# Patient Record
Sex: MALE | Race: BLACK OR AFRICAN AMERICAN
[De-identification: names, ages, dates, MRNs, and addresses within clinical notes are randomized per-mention and may not be internally consistent; named-entity substitution may affect disease eponyms.]

---

## 2019-03-20 ENCOUNTER — HOSPITAL ENCOUNTER (INPATIENT)
Dept: HOSPITAL 74 - YASAS | Age: 54
LOS: 4 days | Discharge: HOME | DRG: 773 | End: 2019-03-24
Attending: SURGERY | Admitting: SURGERY
Payer: COMMERCIAL

## 2019-03-20 VITALS — BODY MASS INDEX: 32.8 KG/M2

## 2019-03-20 DIAGNOSIS — Z79.84: ICD-10-CM

## 2019-03-20 DIAGNOSIS — I10: ICD-10-CM

## 2019-03-20 DIAGNOSIS — F17.210: ICD-10-CM

## 2019-03-20 DIAGNOSIS — F11.23: Primary | ICD-10-CM

## 2019-03-20 DIAGNOSIS — F10.230: ICD-10-CM

## 2019-03-20 DIAGNOSIS — Z88.2: ICD-10-CM

## 2019-03-20 DIAGNOSIS — E66.9: ICD-10-CM

## 2019-03-20 DIAGNOSIS — E11.65: ICD-10-CM

## 2019-03-20 DIAGNOSIS — M54.5: ICD-10-CM

## 2019-03-20 DIAGNOSIS — F32.9: ICD-10-CM

## 2019-03-20 DIAGNOSIS — F14.20: ICD-10-CM

## 2019-03-20 DIAGNOSIS — R76.11: ICD-10-CM

## 2019-03-20 DIAGNOSIS — Z21: ICD-10-CM

## 2019-03-20 PROCEDURE — HZ2ZZZZ DETOXIFICATION SERVICES FOR SUBSTANCE ABUSE TREATMENT: ICD-10-PCS | Performed by: SURGERY

## 2019-03-20 RX ADMIN — Medication SCH MG: at 22:46

## 2019-03-21 LAB
ALBUMIN SERPL-MCNC: 2.6 G/DL (ref 3.4–5)
ALP SERPL-CCNC: 78 U/L (ref 45–117)
ALT SERPL-CCNC: 15 U/L (ref 13–61)
ANION GAP SERPL CALC-SCNC: 5 MMOL/L (ref 8–16)
AST SERPL-CCNC: 8 U/L (ref 15–37)
BILIRUB SERPL-MCNC: 0.3 MG/DL (ref 0.2–1)
BUN SERPL-MCNC: 14 MG/DL (ref 7–18)
CALCIUM SERPL-MCNC: 8.4 MG/DL (ref 8.5–10.1)
CHLORIDE SERPL-SCNC: 112 MMOL/L (ref 98–107)
CO2 SERPL-SCNC: 28 MMOL/L (ref 21–32)
CREAT SERPL-MCNC: 1.3 MG/DL (ref 0.55–1.3)
DEPRECATED RDW RBC AUTO: 15.3 % (ref 11.9–15.9)
GLUCOSE SERPL-MCNC: 133 MG/DL (ref 74–106)
HCT VFR BLD CALC: 38.6 % (ref 35.4–49)
HGB BLD-MCNC: 12.6 GM/DL (ref 11.7–16.9)
MCH RBC QN AUTO: 29.1 PG (ref 25.7–33.7)
MCHC RBC AUTO-ENTMCNC: 32.6 G/DL (ref 32–35.9)
MCV RBC: 89.5 FL (ref 80–96)
PLATELET # BLD AUTO: 198 K/MM3 (ref 134–434)
PMV BLD: 7.9 FL (ref 7.5–11.1)
POTASSIUM SERPLBLD-SCNC: 3.9 MMOL/L (ref 3.5–5.1)
PROT SERPL-MCNC: 6 G/DL (ref 6.4–8.2)
RBC # BLD AUTO: 4.31 M/MM3 (ref 4–5.6)
SODIUM SERPL-SCNC: 145 MMOL/L (ref 136–145)
WBC # BLD AUTO: 5.7 K/MM3 (ref 4–10)

## 2019-03-21 RX ADMIN — AMLODIPINE BESYLATE SCH MG: 10 TABLET ORAL at 10:56

## 2019-03-21 RX ADMIN — Medication SCH TAB: at 10:56

## 2019-03-21 RX ADMIN — NICOTINE SCH: 21 PATCH TRANSDERMAL at 10:56

## 2019-03-21 RX ADMIN — ASPIRIN SCH MG: 81 TABLET, COATED ORAL at 10:56

## 2019-03-21 RX ADMIN — LIDOCAINE SCH PATCH: 50 PATCH TOPICAL at 14:53

## 2019-03-21 RX ADMIN — Medication SCH: at 22:42

## 2019-03-22 RX ADMIN — Medication PRN MG: at 22:02

## 2019-03-22 RX ADMIN — NICOTINE SCH: 21 PATCH TRANSDERMAL at 10:03

## 2019-03-22 RX ADMIN — AMLODIPINE BESYLATE SCH MG: 10 TABLET ORAL at 10:03

## 2019-03-22 RX ADMIN — Medication SCH TAB: at 10:03

## 2019-03-22 RX ADMIN — Medication SCH MG: at 22:02

## 2019-03-22 RX ADMIN — Medication SCH: at 23:10

## 2019-03-22 RX ADMIN — ASPIRIN SCH MG: 81 TABLET, COATED ORAL at 10:03

## 2019-03-22 RX ADMIN — LIDOCAINE SCH PATCH: 50 PATCH TOPICAL at 10:06

## 2019-03-23 VITALS — TEMPERATURE: 98.2 F

## 2019-03-23 RX ADMIN — ASPIRIN SCH MG: 81 TABLET, COATED ORAL at 10:03

## 2019-03-23 RX ADMIN — LIDOCAINE SCH PATCH: 50 PATCH TOPICAL at 10:05

## 2019-03-23 RX ADMIN — Medication SCH EACH: at 22:11

## 2019-03-23 RX ADMIN — Medication SCH MG: at 22:11

## 2019-03-23 RX ADMIN — Medication PRN MG: at 22:11

## 2019-03-23 RX ADMIN — Medication SCH TAB: at 10:03

## 2019-03-23 RX ADMIN — AMLODIPINE BESYLATE SCH MG: 10 TABLET ORAL at 10:03

## 2019-03-23 RX ADMIN — NICOTINE SCH: 21 PATCH TRANSDERMAL at 10:04

## 2019-03-24 VITALS — HEART RATE: 80 BPM | DIASTOLIC BLOOD PRESSURE: 78 MMHG | SYSTOLIC BLOOD PRESSURE: 145 MMHG

## 2019-05-18 ENCOUNTER — HOSPITAL ENCOUNTER (INPATIENT)
Dept: HOSPITAL 74 - YASAS | Age: 54
LOS: 4 days | Discharge: HOME | DRG: 773 | End: 2019-05-22
Attending: SURGERY | Admitting: SURGERY
Payer: COMMERCIAL

## 2019-05-18 VITALS — BODY MASS INDEX: 31.7 KG/M2

## 2019-05-18 DIAGNOSIS — F32.9: ICD-10-CM

## 2019-05-18 DIAGNOSIS — E11.9: ICD-10-CM

## 2019-05-18 DIAGNOSIS — F17.213: ICD-10-CM

## 2019-05-18 DIAGNOSIS — R76.11: ICD-10-CM

## 2019-05-18 DIAGNOSIS — F41.8: ICD-10-CM

## 2019-05-18 DIAGNOSIS — G62.9: ICD-10-CM

## 2019-05-18 DIAGNOSIS — Z79.84: ICD-10-CM

## 2019-05-18 DIAGNOSIS — Z21: ICD-10-CM

## 2019-05-18 DIAGNOSIS — F10.230: Primary | ICD-10-CM

## 2019-05-18 DIAGNOSIS — I10: ICD-10-CM

## 2019-05-18 DIAGNOSIS — F11.23: ICD-10-CM

## 2019-05-18 DIAGNOSIS — F33.1: ICD-10-CM

## 2019-05-18 PROCEDURE — HZ2ZZZZ DETOXIFICATION SERVICES FOR SUBSTANCE ABUSE TREATMENT: ICD-10-PCS | Performed by: SURGERY

## 2019-05-18 RX ADMIN — GABAPENTIN SCH MG: 400 CAPSULE ORAL at 23:32

## 2019-05-18 RX ADMIN — AMLODIPINE BESYLATE SCH MG: 10 TABLET ORAL at 23:33

## 2019-05-18 RX ADMIN — Medication SCH MG: at 23:32

## 2019-05-19 LAB
ALBUMIN SERPL-MCNC: 2.8 G/DL (ref 3.4–5)
ALP SERPL-CCNC: 71 U/L (ref 45–117)
ALT SERPL-CCNC: 24 U/L (ref 13–61)
ANION GAP SERPL CALC-SCNC: 8 MMOL/L (ref 8–16)
AST SERPL-CCNC: 11 U/L (ref 15–37)
BILIRUB SERPL-MCNC: 0.2 MG/DL (ref 0.2–1)
BUN SERPL-MCNC: 10 MG/DL (ref 7–18)
CALCIUM SERPL-MCNC: 8.9 MG/DL (ref 8.5–10.1)
CHLORIDE SERPL-SCNC: 106 MMOL/L (ref 98–107)
CO2 SERPL-SCNC: 29 MMOL/L (ref 21–32)
CREAT SERPL-MCNC: 1.2 MG/DL (ref 0.55–1.3)
DEPRECATED RDW RBC AUTO: 15.7 % (ref 11.9–15.9)
GLUCOSE SERPL-MCNC: 117 MG/DL (ref 74–106)
HCT VFR BLD CALC: 42.2 % (ref 35.4–49)
HGB BLD-MCNC: 13.3 GM/DL (ref 11.7–16.9)
MCH RBC QN AUTO: 28.6 PG (ref 25.7–33.7)
MCHC RBC AUTO-ENTMCNC: 31.6 G/DL (ref 32–35.9)
MCV RBC: 90.6 FL (ref 80–96)
PLATELET # BLD AUTO: 201 K/MM3 (ref 134–434)
PMV BLD: 8.1 FL (ref 7.5–11.1)
POTASSIUM SERPLBLD-SCNC: 3.5 MMOL/L (ref 3.5–5.1)
PROT SERPL-MCNC: 6.3 G/DL (ref 6.4–8.2)
RBC # BLD AUTO: 4.65 M/MM3 (ref 4–5.6)
SODIUM SERPL-SCNC: 142 MMOL/L (ref 136–145)
WBC # BLD AUTO: 8.1 K/MM3 (ref 4–10)

## 2019-05-19 RX ADMIN — Medication SCH TAB: at 10:31

## 2019-05-19 RX ADMIN — METFORMIN HYDROCHLORIDE SCH MG: 500 TABLET ORAL at 17:38

## 2019-05-19 RX ADMIN — GABAPENTIN SCH MG: 400 CAPSULE ORAL at 10:31

## 2019-05-19 RX ADMIN — NICOTINE SCH: 21 PATCH TRANSDERMAL at 10:31

## 2019-05-19 RX ADMIN — METFORMIN HYDROCHLORIDE SCH MG: 500 TABLET ORAL at 06:19

## 2019-05-19 RX ADMIN — GABAPENTIN SCH MG: 400 CAPSULE ORAL at 22:19

## 2019-05-19 RX ADMIN — AMLODIPINE BESYLATE SCH MG: 10 TABLET ORAL at 10:31

## 2019-05-19 RX ADMIN — Medication SCH MG: at 22:19

## 2019-05-20 RX ADMIN — Medication SCH TAB: at 10:35

## 2019-05-20 RX ADMIN — GABAPENTIN SCH MG: 400 CAPSULE ORAL at 10:35

## 2019-05-20 RX ADMIN — AMLODIPINE BESYLATE SCH MG: 10 TABLET ORAL at 10:35

## 2019-05-20 RX ADMIN — METFORMIN HYDROCHLORIDE SCH MG: 500 TABLET ORAL at 17:24

## 2019-05-20 RX ADMIN — Medication SCH MG: at 22:20

## 2019-05-20 RX ADMIN — NICOTINE SCH: 21 PATCH TRANSDERMAL at 10:35

## 2019-05-20 RX ADMIN — GABAPENTIN SCH MG: 400 CAPSULE ORAL at 22:20

## 2019-05-20 RX ADMIN — METFORMIN HYDROCHLORIDE SCH MG: 500 TABLET ORAL at 06:10

## 2019-05-21 RX ADMIN — AMLODIPINE BESYLATE SCH MG: 10 TABLET ORAL at 10:12

## 2019-05-21 RX ADMIN — GABAPENTIN SCH: 400 CAPSULE ORAL at 22:26

## 2019-05-21 RX ADMIN — Medication SCH: at 22:26

## 2019-05-21 RX ADMIN — METFORMIN HYDROCHLORIDE SCH MG: 500 TABLET ORAL at 06:20

## 2019-05-21 RX ADMIN — Medication SCH TAB: at 10:12

## 2019-05-21 RX ADMIN — NICOTINE SCH: 21 PATCH TRANSDERMAL at 10:11

## 2019-05-21 RX ADMIN — METFORMIN HYDROCHLORIDE SCH MG: 500 TABLET ORAL at 17:10

## 2019-05-21 RX ADMIN — GABAPENTIN SCH MG: 400 CAPSULE ORAL at 10:12

## 2019-05-22 VITALS — TEMPERATURE: 97.6 F | SYSTOLIC BLOOD PRESSURE: 146 MMHG | HEART RATE: 79 BPM | DIASTOLIC BLOOD PRESSURE: 91 MMHG

## 2019-05-22 RX ADMIN — METFORMIN HYDROCHLORIDE SCH MG: 500 TABLET ORAL at 06:43

## 2019-12-28 ENCOUNTER — INPATIENT (INPATIENT)
Facility: HOSPITAL | Age: 54
LOS: 1 days | Discharge: HOME | End: 2019-12-30
Attending: INTERNAL MEDICINE | Admitting: INTERNAL MEDICINE
Payer: MEDICAID

## 2019-12-28 VITALS
DIASTOLIC BLOOD PRESSURE: 53 MMHG | WEIGHT: 225.09 LBS | RESPIRATION RATE: 24 BRPM | HEART RATE: 137 BPM | OXYGEN SATURATION: 87 % | SYSTOLIC BLOOD PRESSURE: 119 MMHG | HEIGHT: 69 IN | TEMPERATURE: 99 F

## 2019-12-28 LAB
ALBUMIN SERPL ELPH-MCNC: 3.6 G/DL — SIGNIFICANT CHANGE UP (ref 3.3–5)
ALP SERPL-CCNC: 97 U/L — SIGNIFICANT CHANGE UP (ref 40–120)
ALT FLD-CCNC: 20 U/L — SIGNIFICANT CHANGE UP (ref 12–78)
ANION GAP SERPL CALC-SCNC: 6 MMOL/L — SIGNIFICANT CHANGE UP (ref 5–17)
APTT BLD: 32.4 SEC — SIGNIFICANT CHANGE UP (ref 28.5–37)
AST SERPL-CCNC: 28 U/L — SIGNIFICANT CHANGE UP (ref 15–37)
BASE EXCESS BLDA CALC-SCNC: 2 MMOL/L — SIGNIFICANT CHANGE UP (ref -2–2)
BASOPHILS # BLD AUTO: 0.02 K/UL — SIGNIFICANT CHANGE UP (ref 0–0.2)
BASOPHILS NFR BLD AUTO: 0.2 % — SIGNIFICANT CHANGE UP (ref 0–2)
BILIRUB SERPL-MCNC: 0.6 MG/DL — SIGNIFICANT CHANGE UP (ref 0.2–1.2)
BLOOD GAS COMMENTS: SIGNIFICANT CHANGE UP
BLOOD GAS SOURCE: SIGNIFICANT CHANGE UP
BUN SERPL-MCNC: 10 MG/DL — SIGNIFICANT CHANGE UP (ref 7–23)
CALCIUM SERPL-MCNC: 9 MG/DL — SIGNIFICANT CHANGE UP (ref 8.5–10.1)
CHLORIDE SERPL-SCNC: 106 MMOL/L — SIGNIFICANT CHANGE UP (ref 96–108)
CO2 SERPL-SCNC: 30 MMOL/L — SIGNIFICANT CHANGE UP (ref 22–31)
CREAT SERPL-MCNC: 1.91 MG/DL — HIGH (ref 0.5–1.3)
EOSINOPHIL # BLD AUTO: 0.42 K/UL — SIGNIFICANT CHANGE UP (ref 0–0.5)
EOSINOPHIL NFR BLD AUTO: 4.5 % — SIGNIFICANT CHANGE UP (ref 0–6)
ETHANOL SERPL-MCNC: <10 MG/DL — SIGNIFICANT CHANGE UP (ref 0–10)
FLU A RESULT: SIGNIFICANT CHANGE UP
FLU A RESULT: SIGNIFICANT CHANGE UP
FLUAV AG NPH QL: SIGNIFICANT CHANGE UP
FLUBV AG NPH QL: SIGNIFICANT CHANGE UP
GLUCOSE SERPL-MCNC: 83 MG/DL — SIGNIFICANT CHANGE UP (ref 70–99)
HCO3 BLDA-SCNC: 27 MMOL/L — SIGNIFICANT CHANGE UP (ref 21–29)
HCT VFR BLD CALC: 47.5 % — SIGNIFICANT CHANGE UP (ref 39–50)
HGB BLD-MCNC: 15 G/DL — SIGNIFICANT CHANGE UP (ref 13–17)
HOROWITZ INDEX BLDA+IHG-RTO: 40 — SIGNIFICANT CHANGE UP
IMM GRANULOCYTES NFR BLD AUTO: 0.2 % — SIGNIFICANT CHANGE UP (ref 0–1.5)
INR BLD: 1.19 RATIO — HIGH (ref 0.88–1.16)
LACTATE SERPL-SCNC: 0.9 MMOL/L — SIGNIFICANT CHANGE UP (ref 0.7–2)
LYMPHOCYTES # BLD AUTO: 2.33 K/UL — SIGNIFICANT CHANGE UP (ref 1–3.3)
LYMPHOCYTES # BLD AUTO: 24.8 % — SIGNIFICANT CHANGE UP (ref 13–44)
MCHC RBC-ENTMCNC: 27.7 PG — SIGNIFICANT CHANGE UP (ref 27–34)
MCHC RBC-ENTMCNC: 31.6 GM/DL — LOW (ref 32–36)
MCV RBC AUTO: 87.6 FL — SIGNIFICANT CHANGE UP (ref 80–100)
MONOCYTES # BLD AUTO: 0.77 K/UL — SIGNIFICANT CHANGE UP (ref 0–0.9)
MONOCYTES NFR BLD AUTO: 8.2 % — SIGNIFICANT CHANGE UP (ref 2–14)
NEUTROPHILS # BLD AUTO: 5.84 K/UL — SIGNIFICANT CHANGE UP (ref 1.8–7.4)
NEUTROPHILS NFR BLD AUTO: 62.1 % — SIGNIFICANT CHANGE UP (ref 43–77)
NRBC # BLD: 0 /100 WBCS — SIGNIFICANT CHANGE UP (ref 0–0)
NT-PROBNP SERPL-SCNC: 64 PG/ML — SIGNIFICANT CHANGE UP (ref 0–125)
PCO2 BLDA: 44 MMHG — SIGNIFICANT CHANGE UP (ref 32–46)
PH BLD: 7.4 — SIGNIFICANT CHANGE UP (ref 7.35–7.45)
PLATELET # BLD AUTO: 202 K/UL — SIGNIFICANT CHANGE UP (ref 150–400)
PO2 BLDA: 72 MMHG — LOW (ref 74–108)
POTASSIUM SERPL-MCNC: 4.4 MMOL/L — SIGNIFICANT CHANGE UP (ref 3.5–5.3)
POTASSIUM SERPL-SCNC: 4.4 MMOL/L — SIGNIFICANT CHANGE UP (ref 3.5–5.3)
PROT SERPL-MCNC: 8.2 GM/DL — SIGNIFICANT CHANGE UP (ref 6–8.3)
PROTHROM AB SERPL-ACNC: 13.4 SEC — HIGH (ref 10–12.9)
RBC # BLD: 5.42 M/UL — SIGNIFICANT CHANGE UP (ref 4.2–5.8)
RBC # FLD: 14.1 % — SIGNIFICANT CHANGE UP (ref 10.3–14.5)
RSV RESULT: DETECTED
RSV RNA RESP QL NAA+PROBE: DETECTED
SAO2 % BLDA: 93 % — SIGNIFICANT CHANGE UP (ref 92–96)
SODIUM SERPL-SCNC: 142 MMOL/L — SIGNIFICANT CHANGE UP (ref 135–145)
TROPONIN I SERPL-MCNC: <.015 NG/ML — SIGNIFICANT CHANGE UP (ref 0.01–0.04)
WBC # BLD: 9.4 K/UL — SIGNIFICANT CHANGE UP (ref 3.8–10.5)
WBC # FLD AUTO: 9.4 K/UL — SIGNIFICANT CHANGE UP (ref 3.8–10.5)

## 2019-12-28 PROCEDURE — 71045 X-RAY EXAM CHEST 1 VIEW: CPT | Mod: 26

## 2019-12-28 PROCEDURE — 93010 ELECTROCARDIOGRAM REPORT: CPT

## 2019-12-28 PROCEDURE — 99291 CRITICAL CARE FIRST HOUR: CPT

## 2019-12-28 RX ORDER — ACETAMINOPHEN 500 MG
650 TABLET ORAL ONCE
Refills: 0 | Status: COMPLETED | OUTPATIENT
Start: 2019-12-28 | End: 2019-12-28

## 2019-12-28 RX ORDER — IPRATROPIUM/ALBUTEROL SULFATE 18-103MCG
3 AEROSOL WITH ADAPTER (GRAM) INHALATION
Refills: 0 | Status: COMPLETED | OUTPATIENT
Start: 2019-12-28 | End: 2019-12-28

## 2019-12-28 RX ORDER — SODIUM CHLORIDE 9 MG/ML
2500 INJECTION INTRAMUSCULAR; INTRAVENOUS; SUBCUTANEOUS ONCE
Refills: 0 | Status: COMPLETED | OUTPATIENT
Start: 2019-12-28 | End: 2019-12-28

## 2019-12-28 RX ADMIN — Medication 3 MILLILITER(S): at 22:38

## 2019-12-28 RX ADMIN — Medication 3 MILLILITER(S): at 22:25

## 2019-12-28 RX ADMIN — Medication 3 MILLILITER(S): at 22:27

## 2019-12-28 RX ADMIN — SODIUM CHLORIDE 2500 MILLILITER(S): 9 INJECTION INTRAMUSCULAR; INTRAVENOUS; SUBCUTANEOUS at 22:28

## 2019-12-28 RX ADMIN — Medication 650 MILLIGRAM(S): at 22:26

## 2019-12-28 NOTE — ED ADULT NURSE NOTE - OBJECTIVE STATEMENT
PW with SOB pt states he took albuterol at home several times without effect. Pt denies respiratory hx. Wheezing and productive cough noted, and accessory muscles being utilized in ED. Pt states he takes cocaine unknown amount taken yesterday. pt also states he had few sips of alcohol denies being everyday day drinker. Pt denies HI/SI/AH/VH/ alcohol abuse

## 2019-12-28 NOTE — ED ADULT TRIAGE NOTE - CHIEF COMPLAINT QUOTE
Pt BIBA for SOB x 4-5 days. Has been using albuterol MDI 10x today without relief. No hx of CHF or Asthma. Hx of COPD possibly, pt is unclear. Hx of HIV, psych and substance abuse

## 2019-12-29 DIAGNOSIS — E11.9 TYPE 2 DIABETES MELLITUS WITHOUT COMPLICATIONS: ICD-10-CM

## 2019-12-29 DIAGNOSIS — I10 ESSENTIAL (PRIMARY) HYPERTENSION: ICD-10-CM

## 2019-12-29 DIAGNOSIS — F19.10 OTHER PSYCHOACTIVE SUBSTANCE ABUSE, UNCOMPLICATED: ICD-10-CM

## 2019-12-29 DIAGNOSIS — J44.1 CHRONIC OBSTRUCTIVE PULMONARY DISEASE WITH (ACUTE) EXACERBATION: ICD-10-CM

## 2019-12-29 DIAGNOSIS — N17.9 ACUTE KIDNEY FAILURE, UNSPECIFIED: ICD-10-CM

## 2019-12-29 DIAGNOSIS — Z29.9 ENCOUNTER FOR PROPHYLACTIC MEASURES, UNSPECIFIED: ICD-10-CM

## 2019-12-29 DIAGNOSIS — J21.0 ACUTE BRONCHIOLITIS DUE TO RESPIRATORY SYNCYTIAL VIRUS: ICD-10-CM

## 2019-12-29 DIAGNOSIS — B20 HUMAN IMMUNODEFICIENCY VIRUS [HIV] DISEASE: ICD-10-CM

## 2019-12-29 LAB
AMPHET UR-MCNC: NEGATIVE — SIGNIFICANT CHANGE UP
ANION GAP SERPL CALC-SCNC: 9 MMOL/L — SIGNIFICANT CHANGE UP (ref 5–17)
APPEARANCE UR: CLEAR — SIGNIFICANT CHANGE UP
BACTERIA # UR AUTO: ABNORMAL
BARBITURATES UR SCN-MCNC: NEGATIVE — SIGNIFICANT CHANGE UP
BENZODIAZ UR-MCNC: NEGATIVE — SIGNIFICANT CHANGE UP
BILIRUB UR-MCNC: NEGATIVE — SIGNIFICANT CHANGE UP
BUN SERPL-MCNC: 15 MG/DL — SIGNIFICANT CHANGE UP (ref 7–23)
CALCIUM SERPL-MCNC: 8.3 MG/DL — LOW (ref 8.5–10.1)
CHLORIDE SERPL-SCNC: 105 MMOL/L — SIGNIFICANT CHANGE UP (ref 96–108)
CO2 SERPL-SCNC: 24 MMOL/L — SIGNIFICANT CHANGE UP (ref 22–31)
COCAINE METAB.OTHER UR-MCNC: POSITIVE — SIGNIFICANT CHANGE UP
COLOR SPEC: YELLOW — SIGNIFICANT CHANGE UP
CREAT SERPL-MCNC: 1.84 MG/DL — HIGH (ref 0.5–1.3)
DIFF PNL FLD: ABNORMAL
EPI CELLS # UR: SIGNIFICANT CHANGE UP
GLUCOSE BLDC GLUCOMTR-MCNC: 247 MG/DL — HIGH (ref 70–99)
GLUCOSE SERPL-MCNC: 243 MG/DL — HIGH (ref 70–99)
GLUCOSE UR QL: 1000 MG/DL
KETONES UR-MCNC: ABNORMAL
LDH SERPL L TO P-CCNC: 291 U/L — HIGH (ref 50–242)
LEUKOCYTE ESTERASE UR-ACNC: ABNORMAL
METHADONE UR-MCNC: NEGATIVE — SIGNIFICANT CHANGE UP
NITRITE UR-MCNC: NEGATIVE — SIGNIFICANT CHANGE UP
OPIATES UR-MCNC: POSITIVE — SIGNIFICANT CHANGE UP
PCP SPEC-MCNC: SIGNIFICANT CHANGE UP
PCP UR-MCNC: NEGATIVE — SIGNIFICANT CHANGE UP
PH UR: 5 — SIGNIFICANT CHANGE UP (ref 5–8)
POTASSIUM SERPL-MCNC: 4 MMOL/L — SIGNIFICANT CHANGE UP (ref 3.5–5.3)
POTASSIUM SERPL-SCNC: 4 MMOL/L — SIGNIFICANT CHANGE UP (ref 3.5–5.3)
PROT UR-MCNC: 30 MG/DL
RBC CASTS # UR COMP ASSIST: ABNORMAL /HPF (ref 0–4)
SODIUM SERPL-SCNC: 138 MMOL/L — SIGNIFICANT CHANGE UP (ref 135–145)
SP GR SPEC: 1.02 — SIGNIFICANT CHANGE UP (ref 1.01–1.02)
THC UR QL: NEGATIVE — SIGNIFICANT CHANGE UP
UROBILINOGEN FLD QL: NEGATIVE MG/DL — SIGNIFICANT CHANGE UP
WBC UR QL: ABNORMAL

## 2019-12-29 PROCEDURE — 93970 EXTREMITY STUDY: CPT | Mod: 26

## 2019-12-29 PROCEDURE — 99222 1ST HOSP IP/OBS MODERATE 55: CPT

## 2019-12-29 PROCEDURE — 12345: CPT | Mod: NC

## 2019-12-29 RX ORDER — GLUCAGON INJECTION, SOLUTION 0.5 MG/.1ML
1 INJECTION, SOLUTION SUBCUTANEOUS ONCE
Refills: 0 | Status: DISCONTINUED | OUTPATIENT
Start: 2019-12-29 | End: 2019-12-30

## 2019-12-29 RX ORDER — ALBUTEROL 90 UG/1
2.5 AEROSOL, METERED ORAL ONCE
Refills: 0 | Status: COMPLETED | OUTPATIENT
Start: 2019-12-29 | End: 2019-12-29

## 2019-12-29 RX ORDER — IPRATROPIUM/ALBUTEROL SULFATE 18-103MCG
3 AEROSOL WITH ADAPTER (GRAM) INHALATION EVERY 6 HOURS
Refills: 0 | Status: DISCONTINUED | OUTPATIENT
Start: 2019-12-29 | End: 2019-12-30

## 2019-12-29 RX ORDER — BICTEGRAVIR SODIUM, EMTRICITABINE, AND TENOFOVIR ALAFENAMIDE FUMARATE 30; 120; 15 MG/1; MG/1; MG/1
1 TABLET ORAL DAILY
Refills: 0 | Status: DISCONTINUED | OUTPATIENT
Start: 2019-12-29 | End: 2019-12-30

## 2019-12-29 RX ORDER — LOSARTAN POTASSIUM 100 MG/1
25 TABLET, FILM COATED ORAL DAILY
Refills: 0 | Status: DISCONTINUED | OUTPATIENT
Start: 2019-12-29 | End: 2019-12-30

## 2019-12-29 RX ORDER — INSULIN LISPRO 100/ML
VIAL (ML) SUBCUTANEOUS
Refills: 0 | Status: DISCONTINUED | OUTPATIENT
Start: 2019-12-29 | End: 2019-12-30

## 2019-12-29 RX ORDER — TIOTROPIUM BROMIDE 18 UG/1
1 CAPSULE ORAL; RESPIRATORY (INHALATION) DAILY
Refills: 0 | Status: DISCONTINUED | OUTPATIENT
Start: 2019-12-29 | End: 2019-12-30

## 2019-12-29 RX ORDER — CEFTRIAXONE 500 MG/1
1000 INJECTION, POWDER, FOR SOLUTION INTRAMUSCULAR; INTRAVENOUS ONCE
Refills: 0 | Status: COMPLETED | OUTPATIENT
Start: 2019-12-29 | End: 2019-12-29

## 2019-12-29 RX ORDER — SODIUM CHLORIDE 9 MG/ML
1000 INJECTION, SOLUTION INTRAVENOUS
Refills: 0 | Status: DISCONTINUED | OUTPATIENT
Start: 2019-12-29 | End: 2019-12-30

## 2019-12-29 RX ORDER — DEXTROSE 50 % IN WATER 50 %
12.5 SYRINGE (ML) INTRAVENOUS ONCE
Refills: 0 | Status: DISCONTINUED | OUTPATIENT
Start: 2019-12-29 | End: 2019-12-30

## 2019-12-29 RX ORDER — AZITHROMYCIN 500 MG/1
500 TABLET, FILM COATED ORAL ONCE
Refills: 0 | Status: COMPLETED | OUTPATIENT
Start: 2019-12-29 | End: 2019-12-29

## 2019-12-29 RX ORDER — DEXTROSE 50 % IN WATER 50 %
15 SYRINGE (ML) INTRAVENOUS ONCE
Refills: 0 | Status: DISCONTINUED | OUTPATIENT
Start: 2019-12-29 | End: 2019-12-30

## 2019-12-29 RX ORDER — ENOXAPARIN SODIUM 100 MG/ML
40 INJECTION SUBCUTANEOUS DAILY
Refills: 0 | Status: DISCONTINUED | OUTPATIENT
Start: 2019-12-29 | End: 2019-12-30

## 2019-12-29 RX ORDER — LANOLIN ALCOHOL/MO/W.PET/CERES
3 CREAM (GRAM) TOPICAL ONCE
Refills: 0 | Status: COMPLETED | OUTPATIENT
Start: 2019-12-29 | End: 2019-12-29

## 2019-12-29 RX ORDER — DEXTROSE 50 % IN WATER 50 %
25 SYRINGE (ML) INTRAVENOUS ONCE
Refills: 0 | Status: DISCONTINUED | OUTPATIENT
Start: 2019-12-29 | End: 2019-12-30

## 2019-12-29 RX ORDER — LIDOCAINE 4 G/100G
1 CREAM TOPICAL ONCE
Refills: 0 | Status: COMPLETED | OUTPATIENT
Start: 2019-12-29 | End: 2019-12-29

## 2019-12-29 RX ADMIN — Medication 3 MILLILITER(S): at 05:46

## 2019-12-29 RX ADMIN — Medication 2: at 07:56

## 2019-12-29 RX ADMIN — Medication 3 MILLILITER(S): at 23:45

## 2019-12-29 RX ADMIN — CEFTRIAXONE 100 MILLIGRAM(S): 500 INJECTION, POWDER, FOR SOLUTION INTRAMUSCULAR; INTRAVENOUS at 02:32

## 2019-12-29 RX ADMIN — Medication 40 MILLIGRAM(S): at 13:45

## 2019-12-29 RX ADMIN — Medication 3 MILLIGRAM(S): at 21:36

## 2019-12-29 RX ADMIN — LIDOCAINE 1 PATCH: 4 CREAM TOPICAL at 23:27

## 2019-12-29 RX ADMIN — Medication 3 MILLILITER(S): at 17:01

## 2019-12-29 RX ADMIN — Medication 100 MILLIGRAM(S): at 21:36

## 2019-12-29 RX ADMIN — Medication 3 MILLILITER(S): at 13:20

## 2019-12-29 RX ADMIN — Medication 352.08 MILLIGRAM(S): at 02:57

## 2019-12-29 RX ADMIN — AZITHROMYCIN 255 MILLIGRAM(S): 500 TABLET, FILM COATED ORAL at 01:18

## 2019-12-29 RX ADMIN — Medication 1: at 16:20

## 2019-12-29 RX ADMIN — Medication 1: at 11:26

## 2019-12-29 RX ADMIN — Medication 125 MILLIGRAM(S): at 00:15

## 2019-12-29 RX ADMIN — ENOXAPARIN SODIUM 40 MILLIGRAM(S): 100 INJECTION SUBCUTANEOUS at 11:27

## 2019-12-29 RX ADMIN — Medication 200 MILLIGRAM(S): at 09:33

## 2019-12-29 RX ADMIN — Medication 200 MILLIGRAM(S): at 20:04

## 2019-12-29 RX ADMIN — Medication 40 MILLIGRAM(S): at 06:00

## 2019-12-29 RX ADMIN — Medication 40 MILLIGRAM(S): at 21:37

## 2019-12-29 RX ADMIN — ALBUTEROL 2.5 MILLIGRAM(S): 90 AEROSOL, METERED ORAL at 01:53

## 2019-12-29 NOTE — ED PROVIDER NOTE - CARE PLAN
Principal Discharge DX:	Bronchitis  Secondary Diagnosis:	RSV (acute bronchiolitis due to respiratory syncytial virus)

## 2019-12-29 NOTE — CHART NOTE - NSCHARTNOTEFT_GEN_A_CORE
54M with a PMH of HIV noncompliant with Biktarvy, HTN, COPD, DM, Substance abuse who presents to ED complaining for dyspnea and cough x 1 week.  Patient reports first onset of nonproductive cough then progressively worsening dyspnea.  States he has had COPD exacerbations before but they have never been this bad.  Reports subjective fever and chills at home but never took his temperature.  Denies history of nausea, vomiting, diarrhea, chest pain, palpitations, syncope.  Reports active substance abuse with heroin and cocaine yesterday.  Active smoker, smokes about 1 pack per day.  Reports being on Biktarvy for HIV, stopped taking medications a long time ago.    Pt was seen and examined, no acute events.  + RSV, on Robitussin, but still coughing, will add benzonatate. explained tp pt cough will take time to resolve.  Follow up viral load and CD4 count, ID eval  Borderline LDH, CXR clear. doubt PCP, hypoxic in ABG on admission, will repeat, if remains hypoxic, might need to consider CT chest  COPD, current smoker, On solumedrol, duoneb, pulm follow up   CKD vs ANDREW, follow lab.

## 2019-12-29 NOTE — H&P ADULT - HISTORY OF PRESENT ILLNESS
*Patient is HIV positive - unknown to his girlfriend    Patient is a 54M with a PMH of HIV noncompliant with Biktarvy, HTN, COPD, DM, Substance abuse who presents to ED complaining for dyspnea and cough x 1 week.  Patient reports first onset of nonproductive cough then progressively worsening dyspnea.  States he has had COPD exacerbations before but they have never been this bad.  Reports subjective fever and chills at home but never took his temperature.  Denies history of nausea, vomiting, diarrhe, chest pain, palpitations, syncope.  Reports active substance abuse with heroin and cocaine yesterday.  Active smoker, smokes about 1 pack per day.  Reports being on Biktarvy for HIV, stopped taking medications a long time ago.

## 2019-12-29 NOTE — ED PROVIDER NOTE - CLINICAL SUMMARY MEDICAL DECISION MAKING FREE TEXT BOX
pt improved but still wheezing and still with hypoxia on NC. molly give abx, d/w dr. escobedo for admission.

## 2019-12-29 NOTE — H&P ADULT - ASSESSMENT
54M with HIV, COPD presents with cough and shortness of breath.  Labs benign.  Patient is RSV +ve, likely exacerbating COPD.  Will admit to floor.  Will r/o PCP.  ID eval      IMPROVE VTE Individual Risk Assessment          RISK                                                          Points  [  ] Previous VTE                                                3  [  ] Thrombophilia                                             2  [  ] Lower limb paralysis                                    2        (unable to hold up >15 seconds)    [  ] Current Cancer                                             2         (within 6 months)  [  ] Immobilization > 24 hrs                              1  [  ] ICU/CCU stay > 24 hours                            1  [  ] Age > 60                                                    1    IMPROVE VTE Score - 0

## 2019-12-29 NOTE — CONSULT NOTE ADULT - ASSESSMENT
PULMONARY/CRITICAL CARE ASSESSMENT/RECOMMENDATIONS                    RESP GAS EXCHANGE 12/28/2019 11p .4 740/44/72  12/29/2019 ra 87% Monitor po Target po 90-95%  RO DVT 12/29/2019 Check v duplex   COPD ex Duoneb.4 (12/29) solumed 40.3 (12/28) spiriva (12/29)   INFECTION 12/29/2019 W 9.4 12/29/2019 CXR no infiltr 12/29/2019 rsv R Check procalci   COUGH Guiafenesin 200.4p (12/29)   HYTN Losartan 25 (12/29)   HIV Suggest resume CAMACHO meds Consider id eval   DRUG ABUSE 12/29/2019   drug screen cocaine pos opiate pos  monitor for drug withdrawal    ANDREW 12/29/2019 Cr 1.9 Monitor lytes cr serially     TIME SPENT Over 55 minutes aggregate care time spent on encounter; activities included   direct pt care, counseling and/or coordinating care reviewing notes, lab data/ imaging , discussion with multidisciplinary team/ pt /family. Risks, benefits, alternatives  discussed in detail.
hiv  copd exacerbation   ac bronchitis ?? RSV   ch pain   drug abuse  will follow with you thanks

## 2019-12-29 NOTE — CONSULT NOTE ADULT - SUBJECTIVE AND OBJECTIVE BOX
HPI:  *Patient is HIV positive - unknown to his girlfriend  Patient is a 54M with a PMH of HIV noncompliant with Biktarvy, HTN, COPD, DM, Substance abuse who presents to ED complaining for dyspnea and cough x 1 week.  Patient reports first onset of nonproductive cough then progressively worsening dyspnea.  States he has had COPD exacerbations before but they have never been this bad.  Reports subjective fever and chills at home but never took his temperature.  Denies history of nausea, vomiting, diarrhe, chest pain, palpitations, syncope.  Reports active substance abuse with heroin and cocaine yesterday.  Active smoker, smokes about 1 pack per day.  Reports being on Biktarvy for HIV, stopped taking medications a long time ago. (29 Dec 2019 02:23)      PAST MEDICAL & SURGICAL HISTORY:      SOCHX:   tobacco,  -  alcohol    FMHX: FA/MO  - contributory       Recent Travel:    Immunizations:    Allergies    No Known Allergies    Intolerances        MEDICATIONS  (STANDING):  albuterol/ipratropium for Nebulization 3 milliLiter(s) Nebulizer every 6 hours  dextrose 5%. 1000 milliLiter(s) (50 mL/Hr) IV Continuous <Continuous>  dextrose 50% Injectable 12.5 Gram(s) IV Push once  dextrose 50% Injectable 25 Gram(s) IV Push once  dextrose 50% Injectable 25 Gram(s) IV Push once  enoxaparin Injectable 40 milliGRAM(s) SubCutaneous daily  insulin lispro (HumaLOG) corrective regimen sliding scale   SubCutaneous three times a day before meals  losartan 25 milliGRAM(s) Oral daily  methylPREDNISolone sodium succinate Injectable 40 milliGRAM(s) IV Push every 8 hours  tiotropium 18 MICROgram(s) Capsule 1 Capsule(s) Inhalation daily    MEDICATIONS  (PRN):  benzonatate 100 milliGRAM(s) Oral three times a day PRN Cough  dextrose 40% Gel 15 Gram(s) Oral once PRN Blood Glucose LESS THAN 70 milliGRAM(s)/deciliter  glucagon  Injectable 1 milliGRAM(s) IntraMuscular once PRN Glucose LESS THAN 70 milligrams/deciliter  guaiFENesin   Syrup  (Sugar-Free) 200 milliGRAM(s) Oral every 6 hours PRN Cough      REVIEW OF SYSTEMS:  CONSTITUTIONAL: No fever, weight loss, or fatigue  EYES: No eye pain, visual disturbances, or discharge  ENMT:  No difficulty hearing, tinnitus, vertigo; No sinus or throat pain  NECK: No pain or stiffness  BREASTS: No pain, masses, or nipple discharge  RESPIRATORY: No cough, wheezing, chills or hemoptysis; No shortness of breath  CARDIOVASCULAR: No chest pain, palpitations, dizziness, or leg swelling  GASTROINTESTINAL: No abdominal or epigastric pain. No nausea, vomiting, or hematemesis; No diarrhea or constipation. No melena or hematochezia.  GENITOURINARY: No dysuria, frequency, hematuria, or incontinence  NEUROLOGICAL: No headaches, memory loss, loss of strength, numbness, or tremors  SKIN: No itching, burning, rashes, or lesions   LYMPH NODES: No enlarged glands  ENDOCRINE: No heat or cold intolerance; No hair loss  MUSCULOSKELETAL: No joint pain or swelling; No muscle, back, or extremity pain  PSYCHIATRIC: No depression, anxiety, mood swings, or difficulty sleeping  HEME/LYMPH: No easy bruising, or bleeding gums  ALLERGY AND IMMUNOLOGIC: No hives or eczema    VITAL SIGNS:    T(C): 36.4 (19 @ 17:33), Max: 37.6 (19 @ 01:20)  T(F): 97.6 (19 @ 17:33), Max: 99.6 (19 @ 01:20)  HR: 78 (19 @ 17:33) (73 - 137)  BP: 146/71 (19 @ 17:33) (99/61 - 150/77)  RR: 18 (19 @ 17:33) (18 - 24)  SpO2: 96% (19 @ 17:33) (87% - 98%)    PHYSICAL EXAM:    GENERAL: NAD, well-groomed, well-developed  HEAD:  Atraumatic, Normocephalic  EYES: EOMI, PERRLA, conjunctiva and sclera clear  ENMT: No tonsillar erythema, exudates, or enlargement; Moist mucous membranes, Good dentition, No lesions  NECK: Supple, No JVD, Normal thyroid  NERVOUS SYSTEM:  Alert & Oriented X3, Good concentration; Motor Strength 5/5 B/L upper and lower extremities; DTRs 2+ intact and symmetric  CHEST/LUNG: Clear to auscultation bilaterally; No rales, rhonchi, wheezing bilaterally  HEART: Regular rate and rhythm; No murmurs, rubs, or gallops  ABDOMEN: Soft, Nontender, Nondistended; Bowel sounds present  EXTREMITIES:  2+ Peripheral Pulses, No clubbing, cyanosis, or edema  LYMPH: No lymphadenopathy noted  SKIN: No rashes or lesions  BACK: no pressor sore     LABS:                         15.0   9.40  )-----------( 202      ( 28 Dec 2019 22:45 )             47.5         138  |  105  |  15  ----------------------------<  243<H>  4.0   |  24  |  1.84<H>    Ca    8.3<L>      29 Dec 2019 05:39    TPro  8.2  /  Alb  3.6  /  TBili  0.6  /  DBili  x   /  AST  28  /  ALT  20  /  AlkPhos  97      LIVER FUNCTIONS - ( 28 Dec 2019 22:45 )  Alb: 3.6 g/dL / Pro: 8.2 gm/dL / ALK PHOS: 97 U/L / ALT: 20 U/L / AST: 28 U/L / GGT: x           PT/INR - ( 28 Dec 2019 22:45 )   PT: 13.4 sec;   INR: 1.19 ratio         PTT - ( 28 Dec 2019 22:45 )  PTT:32.4 sec  Urinalysis Basic - ( 29 Dec 2019 10:34 )    Color: Yellow / Appearance: Clear / S.025 / pH: x  Gluc: x / Ketone: Trace  / Bili: Negative / Urobili: Negative mg/dL   Blood: x / Protein: 30 mg/dL / Nitrite: Negative   Leuk Esterase: Trace / RBC: 3-5 /HPF / WBC 6-10   Sq Epi: x / Non Sq Epi: Few / Bacteria: Few      ABG - ( 28 Dec 2019 23:10 )  pH, Arterial: x     pH, Blood: 7.40  /  pCO2: 44    /  pO2: 72    / HCO3: 27    / Base Excess: 2.0   /  SaO2: 93                CARDIAC MARKERS ( 28 Dec 2019 22:45 )  <.015 ng/mL / x     / x     / x     / x                                      Radiology:    < from: Xray Chest 1 View- PORTABLE-Urgent (19 @ 22:43) >    EXAM:  XR CHEST PORTABLE URGENT 1V                            PROCEDURE DATE:  2019          INTERPRETATION:  Exam:Portable chest    clinical history:Cough and shortness of breath    Heart size is normal. Lungs show no acute infiltrate. No pleural effusion.    IMPRESSION:  No active parenchymal disease in the chest.                    WHIT BANEGAS   This document has been electronically signed. Dec 29 2019  2:42PM                < end of copied text > HPI:  *Patient is HIV positive - unknown to his girlfriend  Patient is a 54M with a PMH of HIV noncompliant with Biktarvy, HTN, COPD, DM, Substance abuse who presents to ED complaining for dyspnea and cough x 1 week.  Patient reports first onset of nonproductive cough then progressively worsening dyspnea.  States he has had COPD exacerbations before but they have never been this bad.  Reports subjective fever and chills at home but never took his temperature.  Denies history of nausea, vomiting, diarrhe, chest pain, palpitations, syncope.  Reports active substance abuse with heroin and cocaine yesterday.  Active smoker, smokes about 1 pack per day.  Reports being on Biktarvy for HIV,   this was started a year ago   2 months ago cd4 and viral load were good       PAST MEDICAL & SURGICAL HISTORY:      SOCHX:   pos tobacco,  pos  alcohol    FMHX: FA/MO  -non contributory       Recent Travel:    Immunizations:    Allergies    No Known Allergies    Intolerances        MEDICATIONS  (STANDING):  albuterol/ipratropium for Nebulization 3 milliLiter(s) Nebulizer every 6 hours  dextrose 5%. 1000 milliLiter(s) (50 mL/Hr) IV Continuous <Continuous>  dextrose 50% Injectable 12.5 Gram(s) IV Push once  dextrose 50% Injectable 25 Gram(s) IV Push once  dextrose 50% Injectable 25 Gram(s) IV Push once  enoxaparin Injectable 40 milliGRAM(s) SubCutaneous daily  insulin lispro (HumaLOG) corrective regimen sliding scale   SubCutaneous three times a day before meals  losartan 25 milliGRAM(s) Oral daily  methylPREDNISolone sodium succinate Injectable 40 milliGRAM(s) IV Push every 8 hours  tiotropium 18 MICROgram(s) Capsule 1 Capsule(s) Inhalation daily    MEDICATIONS  (PRN):  benzonatate 100 milliGRAM(s) Oral three times a day PRN Cough  dextrose 40% Gel 15 Gram(s) Oral once PRN Blood Glucose LESS THAN 70 milliGRAM(s)/deciliter  glucagon  Injectable 1 milliGRAM(s) IntraMuscular once PRN Glucose LESS THAN 70 milligrams/deciliter  guaiFENesin   Syrup  (Sugar-Free) 200 milliGRAM(s) Oral every 6 hours PRN Cough      REVIEW OF SYSTEMS:  CONSTITUTIONAL: No fever, weight loss, or fatigue  EYES: No eye pain, visual disturbances, or discharge  ENMT:  No difficulty hearing, tinnitus, vertigo; No sinus or throat pain  NECK: No pain or stiffness  RESPIRATORY: pos  cough, wheezing,   has  shortness of breath  CARDIOVASCULAR: No chest pain, palpitations, dizziness, or leg swelling  GASTROINTESTINAL: No abdominal or epigastric pain. No nausea, vomiting, or hematemesis; No diarrhea or constipation. No melena or hematochezia.  GENITOURINARY: No dysuria, frequency, hematuria, or incontinence  NEUROLOGICAL: No headaches, memory loss, loss of strength, numbness, or tremors  SKIN: No itching, burning, rashes, or lesions   LYMPH NODES: No enlarged glands  ENDOCRINE: No heat or cold intolerance; No hair loss  MUSCULOSKELETAL: back pain ;; rt shoulder pain ; says has a hip fracture  as well   PSYCHIATRIC: No depression, anxiety, mood swings, or difficulty sleeping  HEME/LYMPH: No easy bruising, or bleeding gums  ALLERGY AND IMMUNOLOGIC: No hives or eczema    VITAL SIGNS:    T(C): 36.4 (19 @ 17:33), Max: 37.6 (19 @ 01:20)  T(F): 97.6 (19 @ 17:33), Max: 99.6 (19 @ 01:20)  HR: 78 (19 @ 17:33) (73 - 137)  BP: 146/71 (19 @ 17:33) (99/61 - 150/77)  RR: 18 (19 @ 17:33) (18 - 24)  SpO2: 96% (19 @ 17:33) (87% - 98%)    PHYSICAL EXAM:    GENERAL: NAD, well-groomed, well-developed  muscular  HEAD:  Atraumatic, Normocephalic  EYES: EOMI, PERRLA, conjunctiva and sclera clear  ENMT:  Moist mucous membranes,   NECK: Supple, No JVD, Normal thyroid  NERVOUS SYSTEM:  Alert & Oriented X3, Good concentration; Motor Strength 5/5 B/L upper and lower extremities;   does not move his rt arm says pain   CHEST/LUNG: decreased  to auscultation bilaterally;   bilateral wheeze  HEART: Regular rate and rhythm; No murmurs, rubs, or gallops  ABDOMEN: Soft, Nontender, Nondistended; Bowel sounds present  EXTREMITIES:  2+ Peripheral Pulses, No clubbing, cyanosis, or edema  LYMPH: No lymphadenopathy noted  SKIN: No rashes or lesions  BACK: no pressor sore     LABS:                         15.0   9.40  )-----------( 202      ( 28 Dec 2019 22:45 )             47.5         138  |  105  |  15  ----------------------------<  243<H>  4.0   |  24  |  1.84<H>    Ca    8.3<L>      29 Dec 2019 05:39    TPro  8.2  /  Alb  3.6  /  TBili  0.6  /  DBili  x   /  AST  28  /  ALT  20  /  AlkPhos  97      LIVER FUNCTIONS - ( 28 Dec 2019 22:45 )  Alb: 3.6 g/dL / Pro: 8.2 gm/dL / ALK PHOS: 97 U/L / ALT: 20 U/L / AST: 28 U/L / GGT: x           PT/INR - ( 28 Dec 2019 22:45 )   PT: 13.4 sec;   INR: 1.19 ratio         PTT - ( 28 Dec 2019 22:45 )  PTT:32.4 sec  Urinalysis Basic - ( 29 Dec 2019 10:34 )    Color: Yellow / Appearance: Clear / S.025 / pH: x  Gluc: x / Ketone: Trace  / Bili: Negative / Urobili: Negative mg/dL   Blood: x / Protein: 30 mg/dL / Nitrite: Negative   Leuk Esterase: Trace / RBC: 3-5 /HPF / WBC 6-10   Sq Epi: x / Non Sq Epi: Few / Bacteria: Few      ABG - ( 28 Dec 2019 23:10 )  pH, Arterial: x     pH, Blood: 7.40  /  pCO2: 44    /  pO2: 72    / HCO3: 27    / Base Excess: 2.0   /  SaO2: 93                CARDIAC MARKERS ( 28 Dec 2019 22:45 )  <.015 ng/mL / x     / x     / x     / x                                      Radiology:    < from: Xray Chest 1 View- PORTABLE-Urgent (19 @ 22:43) >    EXAM:  XR CHEST PORTABLE URGENT 1V                            PROCEDURE DATE:  2019          INTERPRETATION:  Exam:Portable chest    clinical history:Cough and shortness of breath    Heart size is normal. Lungs show no acute infiltrate. No pleural effusion.    IMPRESSION:  No active parenchymal disease in the chest.                    WHIT BANEGAS   This document has been electronically signed. Dec 29 2019  2:42PM                < end of copied text >

## 2019-12-29 NOTE — H&P ADULT - NSHPLABSRESULTS_GEN_ALL_CORE
Recent Vitals  T(C): 37.6 (12-29-19 @ 01:20), Max: 37.6 (12-29-19 @ 01:20)  HR: 110 (12-29-19 @ 01:20) (108 - 137)  BP: 99/61 (12-29-19 @ 01:20) (99/61 - 119/53)  RR: 19 (12-29-19 @ 01:20) (19 - 24)  SpO2: 97% (12-29-19 @ 01:20) (87% - 97%)                        15.0   9.40  )-----------( 202      ( 28 Dec 2019 22:45 )             47.5     12-28    142  |  106  |  10  ----------------------------<  83  4.4   |  30  |  1.91<H>    Ca    9.0      28 Dec 2019 22:45    TPro  8.2  /  Alb  3.6  /  TBili  0.6  /  DBili  x   /  AST  28  /  ALT  20  /  AlkPhos  97  12-28    PT/INR - ( 28 Dec 2019 22:45 )   PT: 13.4 sec;   INR: 1.19 ratio         PTT - ( 28 Dec 2019 22:45 )  PTT:32.4 sec  LIVER FUNCTIONS - ( 28 Dec 2019 22:45 )  Alb: 3.6 g/dL / Pro: 8.2 gm/dL / ALK PHOS: 97 U/L / ALT: 20 U/L / AST: 28 U/L / GGT: x               Home Medications:

## 2019-12-29 NOTE — ED PROVIDER NOTE - OBJECTIVE STATEMENT
53yo male with pmh HTN, HIV compliant (VL undetectable, unknown CD4), COPD, substance abuse, depression bibems for sob and cough. 53yo male with pmh HTN, HIV compliant (VL undetectable, unknown CD4), COPD, substance abuse, depression bibems for sob and cough x 1week, using inhalers without relief. no fever, but diaphoretic and hypoxic and tachypenic at triage.     ROS: No fever/chills. No photophobia/eye pain/changes in vision, No ear pain/sore throat/dysphagia, No chest pain/palpitations. + SOB/cough. No abdominal pain, No N/V/D, no black/bloody bm. No dysuria/frequency/discharge, No headache. No Dizziness.  No rash.  No numbness/tingling/weakness.

## 2019-12-29 NOTE — PATIENT PROFILE ADULT - NSTOBACCOCESSATIONEDU2_GEN_A_NUR
Develop coping skills.  For example, strategies and lifestyle changes that reduce negative moods, stress, and exposure to smoking cues.
present and normal in 4 extremities

## 2019-12-29 NOTE — H&P ADULT - PROBLEM SELECTOR PLAN 1
O2 via NC@ 2L/Min, keep sat >94% at all times.  Solumedrol 40mg IVPB Q8hrs, Spiriva  Duonebs nebulizer Q6hrs standing.

## 2019-12-29 NOTE — H&P ADULT - NSHPPHYSICALEXAM_GEN_ALL_CORE
Physical exam:  General: patient in mild distress due to dyspnea and cough   Cardio: S1/S2 +ve, regular rate and rhythm, no M/G/R  Resp: moderate to severe diffuse wheezing   GI: abdomen soft, nontender, non distended, no guarding, BS +ve x 4  Ext: no significant pedal edema  Neuro: CN 2-12 intact, no significant motor or sensory deficits.

## 2019-12-29 NOTE — CONSULT NOTE ADULT - SUBJECTIVE AND OBJECTIVE BOX
DIVINE FRY  [id number] [id number]  1965 DOA 2019 DR CELENA HERRERA  ALLERGY     nka    CONTACT    Parent Renetta N                    Initial evaluation/Pulmonary Critical Care consultation requested on    by Dr    from Dr Bojorquez   Patient examined chart reviewed    HOSPITAL ADMISSION   PATIENT CAME  FROM (if information available)            DIVINE FRY  53 [id number]  1965 DOA 2019 DR CELENA HERRERA  ALLERGY     nka    CONTACT    Parent Renetta N            REVIEW OF SYMPTOMS      Able to give ROS  Yes     RELIABLE No   CONSTITUTIONAL Weakness Yes  Chills No Vision changes No  ENDOCRINE No unexplained hair loss No heat or cold intolerance    ALLERGY No hives  Sore throat No   RESP Coughing blood no  Shortness of breath YES   NEURO No Headache  Confusion Pain neck No   CARDIAC No Chest pain No Palpitations   GI No Pain abdomen NO   Vomiting NO     PHYSICAL EXAM    HEENT Unremarkable PERRLA atraumatic   RESP Fair air entry EXP prolonged    Harsh breath sound Resp distres mild   CARDIAC S1 S2 No S3     NO JVD    ABDOMEN SOFT BS PRESENT NOT DISTENDED No hepatosplenomegaly PEDAL EDEMA present No calf tenderness  NO rash   GENERAL Not TOXIC looking    EVENTS/VITALS/LABS       2019 afeb 86 140/80   2019 W 9.4 Hb 15 Plt 202  Na 142 K 4.4 CO2 30 Cr 1.9   2019 11p .4 740/44/72   drug screen cocaine pos opiate pos   2019 rsv p      PT DATA/BEST PRACTICE  ALLERGY nka   ADVANCED DIRECTIVE       Goals of care discussion  WT  102 (2019)               BMI   33 (2019)                                                                                                         HEAD OF BED ELEVATION Yes  DYSPHAGIA EVAL   DIET     cons carb ()   IV F              DVT PROPHYLAXIS     lvnx 40 ()            MICROBIO    2019 rsv p  GLYCEMIC CONTROL iss ()       PATIENT DESCRIPTION   54 m admitted 2019 with sob 5 d refractory to albuterol 10/d PMH COPD HIV compliant (VL undetectable cd4 unknown  Psych problems and substance abuse   er vitals 119/53 24 afeb

## 2019-12-30 VITALS — OXYGEN SATURATION: 93 %

## 2019-12-30 LAB
4/8 RATIO: 0.24 RATIO — LOW (ref 0.9–3.6)
ABS CD8: 191 /UL — SIGNIFICANT CHANGE UP (ref 142–740)
ALBUMIN SERPL ELPH-MCNC: 3.1 G/DL — LOW (ref 3.3–5)
ALP SERPL-CCNC: 79 U/L — SIGNIFICANT CHANGE UP (ref 40–120)
ALT FLD-CCNC: 20 U/L — SIGNIFICANT CHANGE UP (ref 12–78)
ANION GAP SERPL CALC-SCNC: 6 MMOL/L — SIGNIFICANT CHANGE UP (ref 5–17)
AST SERPL-CCNC: 36 U/L — SIGNIFICANT CHANGE UP (ref 15–37)
BILIRUB SERPL-MCNC: 0.2 MG/DL — SIGNIFICANT CHANGE UP (ref 0.2–1.2)
BUN SERPL-MCNC: 16 MG/DL — SIGNIFICANT CHANGE UP (ref 7–23)
CALCIUM SERPL-MCNC: 8.9 MG/DL — SIGNIFICANT CHANGE UP (ref 8.5–10.1)
CD16+CD56+ CELLS NFR BLD: 7 % — SIGNIFICANT CHANGE UP (ref 5–23)
CD16+CD56+ CELLS NFR SPEC: 43 /UL — LOW (ref 71–410)
CD19 BLASTS SPEC-ACNC: 304 /UL — SIGNIFICANT CHANGE UP (ref 84–469)
CD19 BLASTS SPEC-ACNC: 50 % — HIGH (ref 6–24)
CD3 BLASTS SPEC-ACNC: 252 /UL — LOW (ref 672–1870)
CD3 BLASTS SPEC-ACNC: 42 % — LOW (ref 59–83)
CD4 %: 8 % — LOW (ref 30–62)
CD8 %: 32 % — SIGNIFICANT CHANGE UP (ref 12–36)
CHLORIDE SERPL-SCNC: 108 MMOL/L — SIGNIFICANT CHANGE UP (ref 96–108)
CO2 SERPL-SCNC: 26 MMOL/L — SIGNIFICANT CHANGE UP (ref 22–31)
CREAT SERPL-MCNC: 1.32 MG/DL — HIGH (ref 0.5–1.3)
CULTURE RESULTS: SIGNIFICANT CHANGE UP
GLUCOSE SERPL-MCNC: 173 MG/DL — HIGH (ref 70–99)
HBA1C BLD-MCNC: 6.4 % — HIGH (ref 4–5.6)
HCT VFR BLD CALC: 41.6 % — SIGNIFICANT CHANGE UP (ref 39–50)
HGB BLD-MCNC: 13.3 G/DL — SIGNIFICANT CHANGE UP (ref 13–17)
HIV-1 VIRAL LOAD RESULT: ABNORMAL
HIV1 RNA # SERPL NAA+PROBE: 154 — SIGNIFICANT CHANGE UP
HIV1 RNA SER-IMP: SIGNIFICANT CHANGE UP
HIV1 RNA SERPL NAA+PROBE-ACNC: ABNORMAL
HIV1 RNA SERPL NAA+PROBE-LOG#: 2.19 — SIGNIFICANT CHANGE UP
MCHC RBC-ENTMCNC: 27.6 PG — SIGNIFICANT CHANGE UP (ref 27–34)
MCHC RBC-ENTMCNC: 32 GM/DL — SIGNIFICANT CHANGE UP (ref 32–36)
MCV RBC AUTO: 86.3 FL — SIGNIFICANT CHANGE UP (ref 80–100)
NRBC # BLD: 0 /100 WBCS — SIGNIFICANT CHANGE UP (ref 0–0)
PLATELET # BLD AUTO: 198 K/UL — SIGNIFICANT CHANGE UP (ref 150–400)
POTASSIUM SERPL-MCNC: 4.7 MMOL/L — SIGNIFICANT CHANGE UP (ref 3.5–5.3)
POTASSIUM SERPL-SCNC: 4.7 MMOL/L — SIGNIFICANT CHANGE UP (ref 3.5–5.3)
PROCALCITONIN SERPL-MCNC: 0.18 NG/ML — HIGH (ref 0.02–0.1)
PROT SERPL-MCNC: 7.3 GM/DL — SIGNIFICANT CHANGE UP (ref 6–8.3)
RBC # BLD: 4.82 M/UL — SIGNIFICANT CHANGE UP (ref 4.2–5.8)
RBC # FLD: 14.2 % — SIGNIFICANT CHANGE UP (ref 10.3–14.5)
SODIUM SERPL-SCNC: 140 MMOL/L — SIGNIFICANT CHANGE UP (ref 135–145)
SPECIMEN SOURCE: SIGNIFICANT CHANGE UP
T-CELL CD4 SUBSET PNL BLD: 45 /UL — LOW (ref 489–1457)
WBC # BLD: 14.42 K/UL — HIGH (ref 3.8–10.5)
WBC # FLD AUTO: 14.42 K/UL — HIGH (ref 3.8–10.5)

## 2019-12-30 PROCEDURE — 99239 HOSP IP/OBS DSCHRG MGMT >30: CPT

## 2019-12-30 RX ORDER — TIOTROPIUM BROMIDE 18 UG/1
1 CAPSULE ORAL; RESPIRATORY (INHALATION)
Qty: 1 | Refills: 0
Start: 2019-12-30 | End: 2020-01-28

## 2019-12-30 RX ORDER — BICTEGRAVIR SODIUM, EMTRICITABINE, AND TENOFOVIR ALAFENAMIDE FUMARATE 30; 120; 15 MG/1; MG/1; MG/1
1 TABLET ORAL
Qty: 0 | Refills: 0 | DISCHARGE
Start: 2019-12-30

## 2019-12-30 RX ORDER — ACETAMINOPHEN 500 MG
650 TABLET ORAL ONCE
Refills: 0 | Status: COMPLETED | OUTPATIENT
Start: 2019-12-30 | End: 2019-12-30

## 2019-12-30 RX ORDER — ALBUTEROL 90 UG/1
2 AEROSOL, METERED ORAL
Qty: 1 | Refills: 2
Start: 2019-12-30 | End: 2020-03-28

## 2019-12-30 RX ORDER — BUDESONIDE AND FORMOTEROL FUMARATE DIHYDRATE 160; 4.5 UG/1; UG/1
2 AEROSOL RESPIRATORY (INHALATION)
Qty: 1 | Refills: 0
Start: 2019-12-30 | End: 2020-01-18

## 2019-12-30 RX ADMIN — Medication 3 MILLILITER(S): at 17:07

## 2019-12-30 RX ADMIN — Medication 650 MILLIGRAM(S): at 00:59

## 2019-12-30 RX ADMIN — Medication 40 MILLIGRAM(S): at 05:58

## 2019-12-30 RX ADMIN — LOSARTAN POTASSIUM 25 MILLIGRAM(S): 100 TABLET, FILM COATED ORAL at 05:58

## 2019-12-30 RX ADMIN — BICTEGRAVIR SODIUM, EMTRICITABINE, AND TENOFOVIR ALAFENAMIDE FUMARATE 1 TABLET(S): 30; 120; 15 TABLET ORAL at 11:29

## 2019-12-30 RX ADMIN — Medication 100 MILLIGRAM(S): at 05:58

## 2019-12-30 RX ADMIN — Medication 200 MILLIGRAM(S): at 13:23

## 2019-12-30 RX ADMIN — Medication 3 MILLILITER(S): at 11:12

## 2019-12-30 RX ADMIN — Medication 3 MILLILITER(S): at 05:57

## 2019-12-30 RX ADMIN — LIDOCAINE 1 PATCH: 4 CREAM TOPICAL at 07:57

## 2019-12-30 RX ADMIN — Medication 650 MILLIGRAM(S): at 00:16

## 2019-12-30 RX ADMIN — Medication 40 MILLIGRAM(S): at 13:23

## 2019-12-30 RX ADMIN — Medication 2: at 11:29

## 2019-12-30 RX ADMIN — Medication 1: at 07:55

## 2019-12-30 RX ADMIN — ENOXAPARIN SODIUM 40 MILLIGRAM(S): 100 INJECTION SUBCUTANEOUS at 11:29

## 2019-12-30 RX ADMIN — LIDOCAINE 1 PATCH: 4 CREAM TOPICAL at 11:29

## 2019-12-30 NOTE — DISCHARGE NOTE PROVIDER - PROVIDER TOKENS
FREE:[LAST:[Primary doctor],PHONE:[(   )    -],FAX:[(   )    -]],PROVIDER:[TOKEN:[[id number]]],PROVIDER:[TOKEN:[[id number]]]

## 2019-12-30 NOTE — DISCHARGE NOTE NURSING/CASE MANAGEMENT/SOCIAL WORK - PATIENT PORTAL LINK FT
You can access the FollowMyHealth Patient Portal offered by [facility] by registering at the following website: [email or web address]. By joining [employer]’s FollowMyHealth portal, you will also be able to view your health information using other applications (apps) compatible with our system.

## 2019-12-30 NOTE — DISCHARGE NOTE PROVIDER - CARE PROVIDER_API CALL
Primary doctor,   Phone: (   )    -  Fax: (   )    -  Follow Up Time:     Allie Martinez)  Infectious Disease; Internal Medicine  [address] [place] [address]  Phone: [phone]  Fax: [phone]  Follow Up Time:     Artur Bojorquez)  Critical Care Medicine; Internal Medicine; Pulmonary Disease  [facility] [place] [address]  Phone: [phone]  Fax: [phone]  Follow Up Time:

## 2019-12-30 NOTE — DISCHARGE NOTE NURSING/CASE MANAGEMENT/SOCIAL WORK - NSDCPEWEB_GEN_ALL_CORE
[facility] for Tobacco Control website --- [email or web address] --- [email or web address]

## 2019-12-30 NOTE — DISCHARGE NOTE PROVIDER - NSDCCPCAREPLAN_GEN_ALL_CORE_FT
PRINCIPAL DISCHARGE DIAGNOSIS  Diagnosis: Bronchitis  Assessment and Plan of Treatment: acute viral bronchitis, positive RSV.  Continue supportive care, cough meds      SECONDARY DISCHARGE DIAGNOSES  Diagnosis: HIV disease  Assessment and Plan of Treatment: HIV: Viral load 154, Cd4 , 45, discussed with ID, viral load can acutely drop in acute viral illness, recommend to repeat CD4 in 4-6 wks, and if still low need to be started on bactrim and azithromycin for PJP, MAC and toxo ppx.    Diagnosis: COPD exacerbation  Assessment and Plan of Treatment: Contineu prescribed meds

## 2020-01-02 DIAGNOSIS — Z91.14 PATIENT'S OTHER NONCOMPLIANCE WITH MEDICATION REGIMEN: ICD-10-CM

## 2020-01-02 DIAGNOSIS — J20.8 ACUTE BRONCHITIS DUE TO OTHER SPECIFIED ORGANISMS: ICD-10-CM

## 2020-01-02 DIAGNOSIS — F14.10 COCAINE ABUSE, UNCOMPLICATED: ICD-10-CM

## 2020-01-02 DIAGNOSIS — F17.210 NICOTINE DEPENDENCE, CIGARETTES, UNCOMPLICATED: ICD-10-CM

## 2020-01-02 DIAGNOSIS — N17.9 ACUTE KIDNEY FAILURE, UNSPECIFIED: ICD-10-CM

## 2020-01-02 DIAGNOSIS — J44.0 CHRONIC OBSTRUCTIVE PULMONARY DISEASE WITH (ACUTE) LOWER RESPIRATORY INFECTION: ICD-10-CM

## 2020-01-02 DIAGNOSIS — B97.4 RESPIRATORY SYNCYTIAL VIRUS AS THE CAUSE OF DISEASES CLASSIFIED ELSEWHERE: ICD-10-CM

## 2020-01-02 DIAGNOSIS — R06.00 DYSPNEA, UNSPECIFIED: ICD-10-CM

## 2020-01-02 DIAGNOSIS — J44.1 CHRONIC OBSTRUCTIVE PULMONARY DISEASE WITH (ACUTE) EXACERBATION: ICD-10-CM

## 2020-01-02 DIAGNOSIS — J44.9 CHRONIC OBSTRUCTIVE PULMONARY DISEASE, UNSPECIFIED: ICD-10-CM

## 2020-01-02 DIAGNOSIS — I10 ESSENTIAL (PRIMARY) HYPERTENSION: ICD-10-CM

## 2020-01-02 DIAGNOSIS — F11.10 OPIOID ABUSE, UNCOMPLICATED: ICD-10-CM

## 2020-01-02 DIAGNOSIS — E11.9 TYPE 2 DIABETES MELLITUS WITHOUT COMPLICATIONS: ICD-10-CM

## 2020-01-02 DIAGNOSIS — Z21 ASYMPTOMATIC HUMAN IMMUNODEFICIENCY VIRUS [HIV] INFECTION STATUS: ICD-10-CM

## 2020-01-03 LAB
CULTURE RESULTS: SIGNIFICANT CHANGE UP
CULTURE RESULTS: SIGNIFICANT CHANGE UP
SPECIMEN SOURCE: SIGNIFICANT CHANGE UP
SPECIMEN SOURCE: SIGNIFICANT CHANGE UP

## 2020-04-01 ENCOUNTER — OUTPATIENT (OUTPATIENT)
Dept: OUTPATIENT SERVICES | Facility: HOSPITAL | Age: 55
LOS: 1 days | End: 2020-04-01

## 2020-04-21 DIAGNOSIS — Z71.89 OTHER SPECIFIED COUNSELING: ICD-10-CM

## 2021-09-07 NOTE — ED ADULT NURSE NOTE - ED STAT RN HANDOFF DETAILS
,DirectAddress_Unknown
Handoff given to RN Obrain pt general condition remains same, utilizing accessory muscles saturation 92 % on 3L nasal cannula. + RSV droplet precaution maintained endorsed for continued care. azithromycin in progress

## 2024-02-09 NOTE — DISCHARGE NOTE PROVIDER - HOSPITAL COURSE
54M with a PMH of HIV noncompliant with Biktarvy, HTN, COPD, DM, Substance abuse who presents to ED complaining for dyspnea and cough x 1 week.  Patient reports first onset of nonproductive cough then progressively worsening dyspnea.  States he has had COPD exacerbations before but they have never been this bad.  Reports subjective fever and chills at home but never took his temperature.  Denies history of nausea, vomiting, diarrhea, chest pain, palpitations, syncope.  Reports active substance abuse with heroin and cocaine yesterday.  Active smoker, smokes about 1 pack per day.  Reports being on Biktarvy for HIV, stopped taking medications a long time ago.        Pt was seen and examined, no acute events.    Acute viral bronchitis, + RSV    HIV: Viral load 154, Cd4 , 45, discussed with ID, viral load can acutely drop in acute viral illness, recommend     to repeat in 4-6 wks, and if still low need to be started on bactrim and azithromycin for PJP, MAC and toxo ppx.    Borderline LDH, CXR clear. doubt PCP, now sat ok.    COPD, current smoker, On solumedrol, duoneb, pulm follow up , will dc on medrol dose pack    Cade improved.        Asked pt about his sexual partners and if they r aware of his HIV status. Pt states he is homosexual and doesn't want to discuss about it. Asked him he is legally obligated and if he doesnt inform them eventually department of health will be notified,. pt states he is aware and does not want to discuss about it.
negative

## 2024-06-06 ENCOUNTER — HOSPITAL ENCOUNTER (INPATIENT)
Dept: HOSPITAL 74 - YASAS | Age: 59
LOS: 3 days | Discharge: HOME | DRG: 773 | End: 2024-06-09
Attending: SURGERY | Admitting: ALLERGY & IMMUNOLOGY
Payer: COMMERCIAL

## 2024-06-06 VITALS — BODY MASS INDEX: 22.1 KG/M2

## 2024-06-06 DIAGNOSIS — B20: ICD-10-CM

## 2024-06-06 DIAGNOSIS — Z79.899: ICD-10-CM

## 2024-06-06 DIAGNOSIS — Z88.2: ICD-10-CM

## 2024-06-06 DIAGNOSIS — I10: ICD-10-CM

## 2024-06-06 DIAGNOSIS — R73.03: ICD-10-CM

## 2024-06-06 DIAGNOSIS — E73.9: ICD-10-CM

## 2024-06-06 DIAGNOSIS — N28.9: ICD-10-CM

## 2024-06-06 DIAGNOSIS — F17.210: ICD-10-CM

## 2024-06-06 DIAGNOSIS — F11.23: Primary | ICD-10-CM

## 2024-06-06 DIAGNOSIS — F14.20: ICD-10-CM

## 2024-06-06 DIAGNOSIS — R26.89: ICD-10-CM

## 2024-06-06 DIAGNOSIS — D64.9: ICD-10-CM

## 2024-06-06 PROCEDURE — HZ2ZZZZ DETOXIFICATION SERVICES FOR SUBSTANCE ABUSE TREATMENT: ICD-10-PCS | Performed by: SURGERY

## 2024-06-06 RX ADMIN — Medication SCH MG: at 21:47

## 2024-06-06 RX ADMIN — ASPIRIN SCH MG: 81 TABLET, COATED ORAL at 18:08

## 2024-06-06 RX ADMIN — METHOCARBAMOL PRN MG: 500 TABLET ORAL at 20:35

## 2024-06-07 LAB
ALBUMIN SERPL-MCNC: 2 G/DL (ref 3.4–5)
ALP SERPL-CCNC: 83 U/L (ref 45–117)
ALT SERPL-CCNC: 14 U/L (ref 13–61)
ANION GAP SERPL CALC-SCNC: 2 MMOL/L (ref 4–13)
AST SERPL-CCNC: 14 U/L (ref 15–37)
BILIRUB SERPL-MCNC: 0.4 MG/DL (ref 0.2–1)
BUN SERPL-MCNC: 21 MG/DL (ref 7–18)
CALCIUM SERPL-MCNC: 8.6 MG/DL (ref 8.5–10.1)
CHLORIDE SERPL-SCNC: 113 MMOL/L (ref 98–107)
CO2 SERPL-SCNC: 29 MMOL/L (ref 21–32)
CREAT SERPL-MCNC: 1.9 MG/DL (ref 0.55–1.3)
DEPRECATED RDW RBC AUTO: 18.3 % (ref 11.9–15.9)
GLUCOSE SERPL-MCNC: 88 MG/DL (ref 74–106)
HCT VFR BLD CALC: 24.7 % (ref 35.4–49)
HGB BLD-MCNC: 7.8 GM/DL (ref 11.7–16.9)
IRON SERPL-MCNC: 87 UG/DL (ref 50–175)
MCH RBC QN AUTO: 27.2 PG (ref 25.7–33.7)
MCHC RBC AUTO-ENTMCNC: 31.7 G/DL (ref 32–35.9)
MCV RBC: 85.7 FL (ref 80–96)
PLATELET # BLD AUTO: 141 10^3/UL (ref 134–434)
PMV BLD: 7.6 FL (ref 7.5–11.1)
POTASSIUM SERPLBLD-SCNC: 3.9 MMOL/L (ref 3.5–5.1)
PROT SERPL-MCNC: 6.5 G/DL (ref 6.4–8.2)
RBC # BLD AUTO: 2.88 M/MM3 (ref 4–5.6)
SODIUM SERPL-SCNC: 144 MMOL/L (ref 136–145)
TIBC SERPL-MCNC: 185 UG/DL (ref 250–450)
WBC # BLD AUTO: 4 K/MM3 (ref 4–10)

## 2024-06-07 RX ADMIN — HYDROXYZINE PAMOATE PRN MG: 25 CAPSULE ORAL at 05:58

## 2024-06-07 RX ADMIN — Medication SCH TAB: at 09:11

## 2024-06-08 LAB
ALBUMIN SERPL-MCNC: 1.9 G/DL (ref 3.4–5)
BUN SERPL-MCNC: 18.1 MG/DL (ref 7–18)
CALCIUM SERPL-MCNC: 8.2 MG/DL (ref 8.5–10.1)
CHLORIDE SERPL-SCNC: 112 MMOL/L (ref 98–107)
CO2 SERPL-SCNC: 28 MMOL/L (ref 21–32)
CREAT SERPL-MCNC: 1.9 MG/DL (ref 0.55–1.3)
GLUCOSE SERPL-MCNC: 111 MG/DL (ref 74–106)
PHOSPHATE SERPL-MCNC: 1.6 MG/DL (ref 2.5–4.9)
POTASSIUM SERPLBLD-SCNC: 3.8 MMOL/L (ref 3.5–5.1)
SODIUM SERPL-SCNC: 145 MMOL/L (ref 136–145)

## 2024-06-08 RX ADMIN — BICTEGRAVIR SODIUM, EMTRICITABINE, AND TENOFOVIR ALAFENAMIDE FUMARATE SCH EACH: 50; 200; 25 TABLET ORAL at 07:24

## 2024-06-09 VITALS — DIASTOLIC BLOOD PRESSURE: 96 MMHG | SYSTOLIC BLOOD PRESSURE: 151 MMHG | HEART RATE: 76 BPM | TEMPERATURE: 98.6 F

## 2024-06-09 VITALS — RESPIRATION RATE: 18 BRPM

## 2024-06-09 LAB
DEPRECATED RDW RBC AUTO: 18.5 % (ref 11.9–15.9)
HCT VFR BLD CALC: 25.8 % (ref 35.4–49)
HGB BLD-MCNC: 8.1 GM/DL (ref 11.7–16.9)
MCH RBC QN AUTO: 27.3 PG (ref 25.7–33.7)
MCHC RBC AUTO-ENTMCNC: 31.5 G/DL (ref 32–35.9)
MCV RBC: 86.8 FL (ref 80–96)
PLATELET # BLD AUTO: 174 10^3/UL (ref 134–434)
PMV BLD: 7.9 FL (ref 7.5–11.1)
RBC # BLD AUTO: 2.98 M/MM3 (ref 4–5.6)
WBC # BLD AUTO: 4.9 K/MM3 (ref 4–10)

## 2024-06-09 RX ADMIN — HYDROXYZINE PAMOATE PRN MG: 25 CAPSULE ORAL at 11:41
